# Patient Record
Sex: MALE | Race: WHITE | Employment: FULL TIME | URBAN - METROPOLITAN AREA
[De-identification: names, ages, dates, MRNs, and addresses within clinical notes are randomized per-mention and may not be internally consistent; named-entity substitution may affect disease eponyms.]

---

## 2023-01-11 ENCOUNTER — HOSPITAL ENCOUNTER (OUTPATIENT)
Dept: GENERAL RADIOLOGY | Age: 28
Discharge: HOME | End: 2023-01-13
Payer: COMMERCIAL

## 2023-01-11 ENCOUNTER — HOSPITAL ENCOUNTER (OUTPATIENT)
Age: 28
Discharge: HOME | End: 2023-01-13
Payer: COMMERCIAL

## 2023-01-11 DIAGNOSIS — R07.89 OTHER CHEST PAIN: ICD-10-CM

## 2023-01-11 PROCEDURE — 71101 X-RAY EXAM UNILAT RIBS/CHEST: CPT

## 2023-02-26 ENCOUNTER — APPOINTMENT (OUTPATIENT)
Dept: GENERAL RADIOLOGY | Age: 28
End: 2023-02-26
Payer: COMMERCIAL

## 2023-02-26 ENCOUNTER — HOSPITAL ENCOUNTER (EMERGENCY)
Age: 28
Discharge: HOME | End: 2023-02-26
Payer: COMMERCIAL

## 2023-02-26 VITALS
HEART RATE: 86 BPM | DIASTOLIC BLOOD PRESSURE: 85 MMHG | TEMPERATURE: 99 F | HEIGHT: 74 IN | RESPIRATION RATE: 16 BRPM | WEIGHT: 278 LBS | OXYGEN SATURATION: 98 % | BODY MASS INDEX: 35.68 KG/M2 | SYSTOLIC BLOOD PRESSURE: 144 MMHG

## 2023-02-26 DIAGNOSIS — S83.92XA SPRAIN OF LEFT KNEE, UNSPECIFIED LIGAMENT, INITIAL ENCOUNTER: Primary | ICD-10-CM

## 2023-02-26 PROCEDURE — 6370000000 HC RX 637 (ALT 250 FOR IP): Performed by: NURSE PRACTITIONER

## 2023-02-26 PROCEDURE — 73562 X-RAY EXAM OF KNEE 3: CPT

## 2023-02-26 PROCEDURE — 99283 EMERGENCY DEPT VISIT LOW MDM: CPT

## 2023-02-26 RX ORDER — IBUPROFEN 800 MG/1
800 TABLET ORAL ONCE
Status: COMPLETED | OUTPATIENT
Start: 2023-02-26 | End: 2023-02-26

## 2023-02-26 RX ADMIN — IBUPROFEN 800 MG: 800 TABLET, FILM COATED ORAL at 12:47

## 2023-02-26 ASSESSMENT — PAIN SCALES - GENERAL: PAINLEVEL_OUTOF10: 4

## 2023-02-26 ASSESSMENT — PAIN DESCRIPTION - LOCATION: LOCATION: KNEE

## 2023-02-26 ASSESSMENT — PAIN DESCRIPTION - ORIENTATION: ORIENTATION: LEFT

## 2023-02-26 ASSESSMENT — PAIN - FUNCTIONAL ASSESSMENT: PAIN_FUNCTIONAL_ASSESSMENT: 0-10

## 2023-02-26 ASSESSMENT — PAIN DESCRIPTION - DESCRIPTORS: DESCRIPTORS: DISCOMFORT

## 2023-02-26 NOTE — Clinical Note
Brittni Blackwell was seen and treated in our emergency department on 2/26/2023. He may return to work on 03/01/2023. If you have any questions or concerns, please don't hesitate to call.       Kajal Goode, HEIDI - CNP

## 2023-02-26 NOTE — ED PROVIDER NOTES
Independent JM Visit. [facility]  Department of Emergency Medicine   ED  Encounter Note  Admit Date/RoomTime: 2023 12:23 PM  ED Room: 33/    NAME: Megan Henry  : 1995  MRN: [id number]     Chief Complaint:  Knee Pain (L knee. No injury. Pt states he was standing and felt a \"click\" and fell)    History of Present Illness       Megan Henry is a 33 y.o. old male who presents to the emergency department by private vehicle, for non-traumatic giving out and pain located medial  to left knee  which occured 1 day(s) prior to arrival.  The complaint is due to standing and twisted and felt it give out. Since onset the symptoms have been remaining constant. Patient has no prior history of pain/injury with regards to today's visit. His pain is aggraveated by pressure on or palpation of painful area and relieved by nothing, as no treatment has been provided prior to this visit. His weight bearing ability is: difficult secondary to discomfort. He denies any head injury, headache, loss of consciousness, confusion, dizziness, neck pain, chest pain, abdominal pain, back pain, numbness, weakness, blurred vision, nausea, vomiting, fever, chills, wounds, or rash. ROS   Pertinent positives and negatives are stated within HPI, all other systems reviewed and are negative. Past Medical History:  has no past medical history on file. Surgical History:  has a past surgical history that includes Tympanostomy tube placement. Social History:  reports that he quit smoking about 8 years ago. His smoking use included cigarettes. He has a 2.00 pack-year smoking history. His smokeless tobacco use includes chew. He reports current alcohol use. He reports that he does not use drugs. Family History: family history includes Cancer in his mother.      Allergies: Nickel    Physical Exam   Oxygen Saturation Interpretation: Normal.        ED Triage Vitals [23 1220]   BP Temp Temp src Heart Rate Resp SpO2 Height Weight   (!) 161/87 99 °F (37.2 °C) -- (!) 105 16 98 % 6' 2\" (1.88 m) 278 lb (126.1 kg)         Constitutional:  Alert, development consistent with age. Neck:  Normal ROM. Supple. Physical Exam  Left Knee: medial            Tenderness:  moderate. .              Swelling/Effusion: None. Deformity: no deformity observed/palpated. ROM: full range with pain. Skin:  no wounds, erythema, or swelling. Hip:            Tenderness:  none. Swelling: None. Deformity: no.              ROM: full range of motion. Skin:  no wounds, erythema, or swelling. Joint(s) Above/Below: calf and ankle. Tenderness:  none. Swelling: No.              Deformity: no deformity observed/palpated. ROM: full range of motion. Skin:  no wounds, erythema, or swelling. Neurovascular: Motor deficit: none. Sensory deficit: none. Pulse deficit: none. Capillary refill: normal.  Gait:  limp due to affected limb. Lymphatics: No lymphangitis or adenopathy noted. Neurological:  Oriented. Motor functions intact. Lab / Imaging Results   (All laboratory and radiology results have been personally reviewed by myself)  Labs:  No results found for this visit on 02/26/23. Imaging: All Radiology results interpreted by Radiologist unless otherwise noted. XR KNEE LEFT (3 VIEWS)   Final Result   No acute abnormality of the knee. ED Course / Medical Decision Making     Medications   ibuprofen (ADVIL;MOTRIN) tablet 800 mg (800 mg Oral Given 2/26/23 1247)        Consult(s):   None    Procedure(s):   None. Medical Decision Making    Patient presents to the ER for left knee pain after stepping the wrong way and feeling a click. Social Determinants include   Social Connections: Not on file    Social Determinants : None.    Chronic conditions  History reviewed. No pertinent past medical history. .    Physical exam patient has tenderness on the medial aspect of the left knee. No neurovascular by neurological deficits. All compartments are soft and compressible. Vital signs hypertensive likely due to pain. Differential diagnoses include but not limited to fracture versus dislocation versus strain. Diagnostic studies interpreted by me revealed x-ray of the left knee was unremarkable. After shared decision making he is placed in a knee immobilizer and remain nonweightbearing with crutches until follow-up with orthopedics due to potential ligament injury. Consults included none. Results were discussed with patient  Patient was given ibuprofen for their symptoms with mild improvement. Patient will be discharged home with the following prescriptions, none. Discussed appropriate use and potential side effects of starting the prescribed medications. Patient continues to be non-toxic on re-evaluation. Findings were discussed with the patient and reasons to immediately return to the ED were articulated to them. They will follow-up with their PMD and orthopedicd. Discharge Instructions:   Patient referred to  [address] [place] [address] Emergency Department  [address] [facility]  [address] [facility] [address]  [phone]  Go to   If symptoms worsen    Ed Jerry DO  [address] [facility] [phone]    Call   to schedule an appt for follow up in 1-2 days    Nery Esquivel DO  [address] [facility] [phone]    Call   to schedule an appt for follow up in 1-2 days    MEDICATIONS:   DISCHARGE MEDICATIONS:  Discharge Medication List as of 2/26/2023  1:16 PM          DISCONTINUED MEDICATIONS:  Discharge Medication List as of 2/26/2023  1:16 PM          Record Review:  Records Reviewed : None       Disposition Considerations:   This patient's ED course included: a personal history and physicial examination, re-evaluation prior to disposition, and multiple bedside re-evaluations  This patient has remained hemodynamically stable during their ED course. I emphasized the importance of follow-up with the physician I referred them to in the timeframe recommended. I discussed with the patient emergent symptoms and the need to immediately return to the ER. Written information was included in their discharge instructions. Additional verbal discharge instructions were also given and discussed with the patient to supplement those generated by the EMR. We also discussed medications that were prescribed  (if any) including common side effects and interactions. The patient was advised to abstain from driving, operating heavy machinery or making significant decisions while taking medications such as opiates and muscle relaxers that may impair this. All questions were addressed. They understand return precautions and discharge instructions. The patient  expressed understanding. Vitals were stable and they were in no distress at discharge. Plan of Care/Counseling:  HEIDI Mendoza CNP reviewed today's visit with the patient in addition to providing specific details for the plan of care and counseling regarding the diagnosis and prognosis. Questions are answered at this time and are agreeable with the plan. Assessment      1. Sprain of left knee, unspecified ligament, initial encounter      Plan   Discharged home. Patient condition is stable    New Medications     Discharge Medication List as of 2/26/2023  1:16 PM        Electronically signed by HEIDI Mendoza CNP   DD: 2/26/23  **This report was transcribed using voice recognition software. Every effort was made to ensure accuracy; however, inadvertent computerized transcription errors may be present.   END OF ED PROVIDER NOTE       HEIDI Mendoza CNP  02/26/23 2007

## 2023-02-27 ENCOUNTER — TELEPHONE (OUTPATIENT)
Dept: ORTHOPEDIC SURGERY | Age: 28
End: 2023-02-27

## 2023-02-27 NOTE — TELEPHONE ENCOUNTER
Spoke to patient to schedule with Dr. Manny Betancourt for an ED f/u from 2/26/23. Patient stated he had scheduled an appt with DANIEL but unable to get in until 3/3/23. Encouraged patient imaging and testing within [facility] which was available to the doctor. Patient will call back.

## 2023-02-28 ENCOUNTER — OFFICE VISIT (OUTPATIENT)
Dept: ORTHOPEDIC SURGERY | Age: 28
End: 2023-02-28

## 2023-02-28 ENCOUNTER — HOSPITAL ENCOUNTER (OUTPATIENT)
Dept: MRI IMAGING | Age: 28
Discharge: HOME | End: 2023-03-02
Payer: COMMERCIAL

## 2023-02-28 VITALS — BODY MASS INDEX: 35.68 KG/M2 | HEIGHT: 74 IN | WEIGHT: 278 LBS

## 2023-02-28 DIAGNOSIS — M25.562 ACUTE PAIN OF LEFT KNEE: Primary | ICD-10-CM

## 2023-02-28 DIAGNOSIS — S83.242A TEAR OF MEDIAL MENISCUS OF LEFT KNEE, CURRENT, UNSPECIFIED TEAR TYPE, INITIAL ENCOUNTER: ICD-10-CM

## 2023-02-28 PROCEDURE — 73721 MRI JNT OF LWR EXTRE W/O DYE: CPT

## 2023-06-30 ENCOUNTER — HOSPITAL ENCOUNTER (OUTPATIENT)
Age: 28
Discharge: HOME | End: 2023-06-30
Payer: COMMERCIAL

## 2023-06-30 LAB
APAP SERPL-MCNC: <5 MCG/ML (ref 10–30)
FERRITIN SERPL-MCNC: 153 NG/ML
GAMMA GLUTAMYL TRANSFERASE: 31 U/L (ref 10–71)
IRON SATN MFR SERPL: 13 % (ref 20–55)
IRON SERPL-MCNC: 46 MCG/DL (ref 59–158)
TIBC SERPL-MCNC: 356 MCG/DL (ref 250–450)

## 2023-06-30 PROCEDURE — 86664 EPSTEIN-BARR NUCLEAR ANTIGEN: CPT

## 2023-06-30 PROCEDURE — 86644 CMV ANTIBODY: CPT

## 2023-06-30 PROCEDURE — 36415 COLL VENOUS BLD VENIPUNCTURE: CPT

## 2023-06-30 PROCEDURE — 86645 CMV ANTIBODY IGM: CPT

## 2023-06-30 PROCEDURE — 86663 EPSTEIN-BARR ANTIBODY: CPT

## 2023-06-30 PROCEDURE — 83540 ASSAY OF IRON: CPT

## 2023-06-30 PROCEDURE — 80074 ACUTE HEPATITIS PANEL: CPT

## 2023-06-30 PROCEDURE — 86665 EPSTEIN-BARR CAPSID VCA: CPT

## 2023-06-30 PROCEDURE — 82977 ASSAY OF GGT: CPT

## 2023-06-30 PROCEDURE — 80143 DRUG ASSAY ACETAMINOPHEN: CPT

## 2023-06-30 PROCEDURE — 82728 ASSAY OF FERRITIN: CPT

## 2023-06-30 PROCEDURE — 83550 IRON BINDING TEST: CPT

## 2023-07-02 LAB
EBV EA-D IGG SER-ACNC: 11.3 U/ML (ref 0–10.9)
EBV NA IGG SER IA-ACNC: 249 U/ML (ref 0–21.9)
EBV VCA IGG SER IA-ACNC: 333 U/ML (ref 0–21.9)
EBV VCA IGM SER IA-ACNC: 16.9 U/ML (ref 0–43.9)

## 2023-07-03 LAB
HAV IGM SERPL QL IA: NORMAL
HBV CORE IGM SERPL QL IA: NORMAL
HBV SURFACE AG SERPL QL IA: NORMAL
HCV AB SERPL QL IA: NORMAL

## 2023-07-05 LAB
CYTOMEGALOVIRUS IGG ANTIBODY: NORMAL
CYTOMEGALOVIRUS IGM ANTIBODY: NORMAL

## 2023-07-13 ENCOUNTER — HOSPITAL ENCOUNTER (OUTPATIENT)
Age: 28
Discharge: HOME | End: 2023-07-13
Payer: COMMERCIAL

## 2023-07-13 LAB
ALBUMIN SERPL-MCNC: 4.8 G/DL (ref 3.5–5.2)
ALP SERPL-CCNC: 61 U/L (ref 40–129)
ALT SERPL-CCNC: 32 U/L (ref 0–40)
ANION GAP SERPL CALCULATED.3IONS-SCNC: 12 MMOL/L (ref 7–16)
AST SERPL-CCNC: 22 U/L (ref 0–39)
BILIRUB DIRECT SERPL-MCNC: <0.2 MG/DL (ref 0–0.3)
BILIRUB INDIRECT SERPL-MCNC: NORMAL MG/DL (ref 0–1)
BILIRUB SERPL-MCNC: 1 MG/DL (ref 0–1.2)
BUN SERPL-MCNC: 13 MG/DL (ref 6–20)
CALCIUM SERPL-MCNC: 9.8 MG/DL (ref 8.6–10.2)
CHLORIDE SERPL-SCNC: 100 MMOL/L (ref 98–107)
CO2 SERPL-SCNC: 26 MMOL/L (ref 22–29)
CREAT SERPL-MCNC: 0.9 MG/DL (ref 0.7–1.2)
GLUCOSE SERPL-MCNC: 84 MG/DL (ref 74–99)
INR BLD: 1
POTASSIUM SERPL-SCNC: 4.5 MMOL/L (ref 3.5–5)
PROT SERPL-MCNC: 7.3 G/DL (ref 6.4–8.3)
PROTHROMBIN TIME: 11.5 SEC (ref 9.3–12.4)
SODIUM SERPL-SCNC: 138 MMOL/L (ref 132–146)

## 2023-07-13 PROCEDURE — 86038 ANTINUCLEAR ANTIBODIES: CPT

## 2023-07-13 PROCEDURE — 86255 FLUORESCENT ANTIBODY SCREEN: CPT

## 2023-07-13 PROCEDURE — 36415 COLL VENOUS BLD VENIPUNCTURE: CPT

## 2023-07-13 PROCEDURE — 86376 MICROSOMAL ANTIBODY EACH: CPT

## 2023-07-13 PROCEDURE — 85610 PROTHROMBIN TIME: CPT

## 2023-07-13 PROCEDURE — 80053 COMPREHEN METABOLIC PANEL: CPT

## 2023-07-13 PROCEDURE — 82248 BILIRUBIN DIRECT: CPT

## 2023-07-14 LAB
ANA SER QL: NEGATIVE
ANTI-MITOCHONDRIAL AB, IFA: NEGATIVE
SMOOTH MUSCLE ANTIBODY: NEGATIVE

## 2023-07-16 LAB — LKM-1 IGG SER IA-ACNC: 0.9 U (ref 0–24.9)

## 2023-07-31 ENCOUNTER — HOSPITAL ENCOUNTER (OUTPATIENT)
Dept: ULTRASOUND IMAGING | Age: 28
Discharge: HOME | End: 2023-08-02
Payer: COMMERCIAL

## 2023-07-31 DIAGNOSIS — R74.8 ELEVATED LIVER ENZYMES: ICD-10-CM

## 2023-07-31 PROCEDURE — 93975 VASCULAR STUDY: CPT

## 2023-07-31 PROCEDURE — 76705 ECHO EXAM OF ABDOMEN: CPT
